# Patient Record
Sex: MALE | Race: OTHER | HISPANIC OR LATINO | URBAN - METROPOLITAN AREA
[De-identification: names, ages, dates, MRNs, and addresses within clinical notes are randomized per-mention and may not be internally consistent; named-entity substitution may affect disease eponyms.]

---

## 2021-01-03 ENCOUNTER — OUTPATIENT (OUTPATIENT)
Dept: OUTPATIENT SERVICES | Facility: HOSPITAL | Age: 36
LOS: 1 days | End: 2021-01-03
Payer: MEDICARE

## 2021-01-03 DIAGNOSIS — Z20.828 CONTACT WITH AND (SUSPECTED) EXPOSURE TO OTHER VIRAL COMMUNICABLE DISEASES: ICD-10-CM

## 2021-01-03 PROCEDURE — U0003: CPT

## 2021-01-03 PROCEDURE — U0005: CPT

## 2021-01-03 PROCEDURE — C9803: CPT

## 2021-01-04 DIAGNOSIS — Z20.828 CONTACT WITH AND (SUSPECTED) EXPOSURE TO OTHER VIRAL COMMUNICABLE DISEASES: ICD-10-CM

## 2021-01-04 LAB — SARS-COV-2 RNA SPEC QL NAA+PROBE: SIGNIFICANT CHANGE UP

## 2021-06-10 ENCOUNTER — EMERGENCY (EMERGENCY)
Facility: HOSPITAL | Age: 36
LOS: 0 days | Discharge: HOME | End: 2021-06-10
Attending: EMERGENCY MEDICINE
Payer: MEDICAID

## 2021-06-10 VITALS
DIASTOLIC BLOOD PRESSURE: 83 MMHG | RESPIRATION RATE: 17 BRPM | SYSTOLIC BLOOD PRESSURE: 117 MMHG | OXYGEN SATURATION: 98 % | HEART RATE: 83 BPM | TEMPERATURE: 99 F

## 2021-06-10 VITALS — HEIGHT: 64 IN | WEIGHT: 160.06 LBS
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
## 2021-06-10 DIAGNOSIS — S86.011A STRAIN OF RIGHT ACHILLES TENDON, INITIAL ENCOUNTER: ICD-10-CM

## 2021-06-10 DIAGNOSIS — M25.571 PAIN IN RIGHT ANKLE AND JOINTS OF RIGHT FOOT: ICD-10-CM
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
## 2021-06-10 PROCEDURE — 29515 APPLICATION SHORT LEG SPLINT: CPT | Mod: RT

## 2021-06-10 PROCEDURE — 73610 X-RAY EXAM OF ANKLE: CPT | Mod: 26,RT

## 2021-06-10 PROCEDURE — 73610 X-RAY EXAM OF ANKLE: CPT | Mod: RT

## 2021-06-10 PROCEDURE — 29515 APPLICATION SHORT LEG SPLINT: CPT

## 2021-06-10 PROCEDURE — 99283 EMERGENCY DEPT VISIT LOW MDM: CPT | Mod: 25

## 2021-06-10 NOTE — ED STATDOCS - CARE PLAN
Principal Discharge DX:	Ankle pain   Principal Discharge DX:	Ankle pain  Secondary Diagnosis:	Achilles tendon sprain, right, initial encounter

## 2021-06-10 NOTE — ED STATDOCS - OBJECTIVE STATEMENT
Pt is a 36 year old male with no PMH who comes to the Ed s/p ankle injury 1 month ago. States that since injury has been walking on foot and having pain. TTP of the gastroc muscle. NVID, Pt able to plantar and dorsiflex. States did not come sooner because he thought it would repair itself.

## 2021-06-10 NOTE — ED STATDOCS - NSFOLLOWUPINSTRUCTIONS_ED_ALL_ED_FT
Achilles Tendon Rupture    AMBULATORY CARE:    An Achilles tendon rupture happens when your Achilles tendon tears, or separates from your heel bone. The Achilles tendon connects your calf muscle to your heel bone. It allows you to point your foot down and to rise on your toes. An Achilles tendon rupture may be caused by a sports injury or a fall.     Signs and symptoms include the following:   •A sudden pop, snap, or crack at the back of your leg      •Severe pain in your leg or ankle, especially when your foot is bent down      •Swelling, stiffness, or weakness in your leg or ankle      •A bruise on the back of your ankle      •Trouble moving or putting weight on your leg      Seek care immediately if:   •Your leg feels warm, tender, and painful. It may look swollen and red.      •Your foot or toes are numb.       Contact your healthcare provider if:   •You have a fever.      •Your symptoms do not get better with treatment.      •You have questions or concerns about your condition or care.      Treatment for an Achilles tendon rupture may depend on your age and activity level. Medicine may be prescribed to decrease pain and swelling. You will need to use a support device such as crutches, or wear a cast or splint. These devices will decrease pressure on your tendon and help it heal. Once you are stronger, you may need physical therapy to increase your flexibility and strength. Some Achilles tendon ruptures may require surgery.   •NSAIDs, such as ibuprofen, help decrease swelling, pain, and fever. This medicine is available with or without a doctor's order. NSAIDs can cause stomach bleeding or kidney problems in certain people. If you take blood thinner medicine, always ask your healthcare provider if NSAIDs are safe for you. Always read the medicine label and follow directions.      •Prescription pain medicine may be given. Ask your healthcare provider how to take this medicine safely. Some prescription pain medicines contain acetaminophen. Do not take other medicines that contain acetaminophen without talking to your healthcare provider. Too much acetaminophen may cause liver damage. Prescription pain medicine may cause constipation. Ask your healthcare provider how to prevent or treat constipation.       •Take your medicine as directed. Contact your healthcare provider if you think your medicine is not helping or if you have side effects. Tell him or her if you are allergic to any medicine. Keep a list of the medicines, vitamins, and herbs you take. Include the amounts, and when and why you take them. Bring the list or the pill bottles to follow-up visits. Carry your medicine list with you in case of an emergency.      Use a support device as directed: You may need crutches or a cane to decrease stress and pressure on your tendon. Your healthcare provider will tell you how much weight you can put on your leg. Ask for more information about how to use crutches or a cane correctly. Wear your brace or splint as directed. This devices will keep your tendon straight and help it heal.     Rest as directed. Your healthcare provider will tell you when it is okay to walk and play sports. You may not be able to play sports for 6 months or longer. Ask when you can go back to work or school. Do not drive until your healthcare provider says it is okay.    Apply ice on your Achilles tendon for 15 to 20 minutes every hour or as directed. Use an ice pack, or put crushed ice in a plastic bag. Cover it with a towel. Ice helps prevent tissue damage and decreases swelling and pain.    Elevate your heel above the level of your heart as often as you can. This will help decrease swelling and pain. Prop your heel on pillows or blankets to keep it elevated comfortably.     Go to physical therapy as directed: A physical therapist teaches you exercises to help improve movement and strength, and to decrease pain. You may not start physical therapy for a few weeks or until your cast is removed.

## 2021-06-10 NOTE — ED STATDOCS - PATIENT PORTAL LINK FT
You can access the FollowMyHealth Patient Portal offered by [facility] by registering at the following website: [email or web address]. By joining [employer]’s FollowMyHealth portal, you will also be able to view your health information using other applications (apps) compatible with our system.

## 2021-06-10 NOTE — ED ADULT TRIAGE NOTE - CHIEF COMPLAINT QUOTE
patient was playing soccer one month ago, sustained injury to his right ankle.  injury continues to cause pain when he is stretching his ankle or with palpation. +limp

## 2021-06-10 NOTE — ED STATDOCS - PROGRESS NOTE DETAILS
Patient seen and evaluated, negative xray, concern for partial achilles tear.  given this is 1 month old, can follow up outpatient.  Placed in posterior splint, provided crutches, reviewed RICE, he will be referred for ortho f/u -Justin Neri PA-C I personally saw the patient with the PA, and completed the key components of the history and physical exam. I then discussed the management plan with the PA.

## 2021-06-11 ENCOUNTER — NON-APPOINTMENT (OUTPATIENT)
Age: 36
End: 2021-06-11

## 2021-06-17 PROBLEM — Z00.00 ENCOUNTER FOR PREVENTIVE HEALTH EXAMINATION: Status: ACTIVE | Noted: 2021-06-17

## 2021-07-01 ENCOUNTER — NON-APPOINTMENT (OUTPATIENT)
Age: 36
End: 2021-07-01

## 2021-07-01 ENCOUNTER — APPOINTMENT (OUTPATIENT)
Dept: ORTHOPEDIC SURGERY | Facility: CLINIC | Age: 36
End: 2021-07-01
Payer: MEDICAID

## 2021-07-01 PROCEDURE — 97760 ORTHOTIC MGMT&TRAING 1ST ENC: CPT

## 2021-07-01 PROCEDURE — 99072 ADDL SUPL MATRL&STAF TM PHE: CPT

## 2021-07-01 PROCEDURE — 99204 OFFICE O/P NEW MOD 45 MIN: CPT | Mod: 25

## 2021-07-01 RX ORDER — ASPIRIN 325 MG/1
325 TABLET ORAL DAILY
Qty: 30 | Refills: 1 | Status: ACTIVE | COMMUNITY
Start: 2021-07-01 | End: 1900-01-01

## 2021-07-01 NOTE — DISCUSSION/SUMMARY
[de-identified] : Today I had a lengthy discussion with the patient regarding their right Achilles tendon injury. I have addressed all the patient's concerns surrounding the pathology of their condition. XR films were reviewed with the patient. \par \par At this time I would like to obtain advanced imaging of the patient's right ankle. An MRI was ordered so I can find out more about the etiology of the patient's condition. The patient should follow up with the office after obtaining the MRI. \par \par I recommended that the patient utilize a CAM boot. The patient was fitted for the CAM boot in the office today. The patient was educated about the boot wear pattern and utilization, as well as the timeframe to come out of the boot. He was also given full instructions for using the boot. I advised the patient to utilize 325 mg of Aspirin as instructed for blood thinning purposes. The prescription for the Aspirin was provided for the patient in the office today. \par \par The patient understood and verbally agreed to the treatment plan. All of their questions were answered and they were satisfied with the visit. The patient should call the office if they have any questions or experience worsening symptoms.

## 2021-07-01 NOTE — PHYSICAL EXAM
[de-identified] : General: Alert and oriented x3. In no acute distress. Pleasant in nature with a normal affect. No apparent respiratory distress.\par \par Left Foot\par Skin: Clean, dry, intact\par Inspection: No obvious malalignment, no masses, no swelling, no effusion\par Pulses: 2+ DP/PT pulses\par ROM: FOOT Full  ROM of digits, ANKLE 10 degrees of dorsiflexion, 40 degrees of plantarflexion, 10 degrees of subtalar motion.\par Painful ROM: None\par Tenderness: No tenderness over the medial malleolus, No tenderness over the lateral malleolus, no CFL/ATFL/PTFL pain, no deltoid ligament pain. No heel pain. No Achilles tenderness. No 5th metatarsal pain. No pain to the LisFranc joint. No ttp over the posterior tibial tendon.\par Stability: Negative anterior/posterior drawer.\par Strength: 5/5 ADD/ABD/TA/GS/EHL/FHL/EDL\par Neuro: Sensation in tact to light touch throughout\par Additional tests: Negative Mortons test, negative tarsal tunnel tinels, negative single heel rise.\par \par Right Foot\par Skin: Clean, dry, intact\par Inspection: No obvious malalignment, no masses, no swelling, no effusion\par Pulses: 2+ DP/PT pulses\par ROM: FOOT Full  ROM of digits, ANKLE 10 degrees of dorsiflexion, 40 degrees of plantarflexion, 10 degrees of subtalar motion.\par Painful ROM: None\par Tenderness: + Defect, +Michele's. No tenderness over the medial malleolus, No tenderness over the lateral malleolus, no CFL/ATFL/PTFL pain, no deltoid ligament pain. No heel pain. No Achilles tenderness. No 5th metatarsal pain. No pain to the LisFranc joint. No ttp over the posterior tibial tendon.\par Stability: Negative anterior/posterior drawer.\par Strength: 5/5 ADD/ABD/TA/GS/EHL/FHL/EDL\par Neuro: Sensation in tact to light touch throughout\par Additional tests: Negative Mortons test, negative tarsal tunnel tinels, negative single heel rise.  [de-identified] : EXAM: XR ANKLE COMP MIN 3 VIEWS RT\par \par \par PROCEDURE DATE: 06/10/2021\par \par \par \par INTERPRETATION: DATE OF STUDY: 6/10/21\par \par COMPARISON: None.\par \par CLINICAL HISTORY: Right ankle pain.\par \par FINDINGS:\par 3 views of the right ankle are submitted. The osseous and articular structures are intact without evidence of fracture or dislocation.\par Mild underlying osteoarthritis. Ankle joint space is maintained. There is mild periarticular cortical roughening and spurring at the medial and lateral aspects of the joint. Mild, chronic, post-traumatic spur/exostosis along ventral margin of the talus on lateral view. The regional soft tissues are maintained.\par \par IMPRESSION: No fracture-subluxation demonstrated.\par \par TEAGAN KEE MD; Attending Radiologist\par This document has been electronically signed. Jun 11 2021 11:37AM

## 2021-07-01 NOTE — ADDENDUM
[FreeTextEntry1] : I, Romero Bearden, acted solely as a scribe for Dr. Yandel Albrecht on this date 07/01/2021  .\par  \par All medical record entries made by the Scribe were at my, Dr. Yandel Albrecht, direction and personally dictated by me on 07/01/2021 . I have reviewed the chart and agree that the record accurately reflects my personal performance of the history, physical exam, assessment and plan. I have also personally directed, reviewed, and agreed with the chart.

## 2021-07-01 NOTE — HISTORY OF PRESENT ILLNESS
[FreeTextEntry1] : SHERYL WRIGHT is a 36 year old male who presents for initial evaluation of right ankle s/p injury 6 weeks ago. Patient was playing soccer and felt someone kick the back of his ankle.

## 2021-07-14 ENCOUNTER — OUTPATIENT (OUTPATIENT)
Dept: OUTPATIENT SERVICES | Facility: HOSPITAL | Age: 36
LOS: 1 days | End: 2021-07-14
Payer: MEDICAID

## 2021-07-14 ENCOUNTER — APPOINTMENT (OUTPATIENT)
Dept: MRI IMAGING | Facility: CLINIC | Age: 36
End: 2021-07-14
Payer: MEDICAID

## 2021-07-14 DIAGNOSIS — S86.001A UNSPECIFIED INJURY OF RIGHT ACHILLES TENDON, INITIAL ENCOUNTER: ICD-10-CM

## 2021-07-14 PROCEDURE — 73721 MRI JNT OF LWR EXTRE W/O DYE: CPT | Mod: 26,RT

## 2021-07-14 PROCEDURE — 73721 MRI JNT OF LWR EXTRE W/O DYE: CPT

## 2021-07-19 ENCOUNTER — APPOINTMENT (OUTPATIENT)
Dept: ORTHOPEDIC SURGERY | Facility: CLINIC | Age: 36
End: 2021-07-19
Payer: MEDICAID

## 2021-07-19 PROCEDURE — 99072 ADDL SUPL MATRL&STAF TM PHE: CPT

## 2021-07-19 PROCEDURE — 99214 OFFICE O/P EST MOD 30 MIN: CPT

## 2021-07-19 NOTE — PHYSICAL EXAM
[de-identified] : General: Alert and oriented x3. In no acute distress. Pleasant in nature with a normal affect. No apparent respiratory distress.\par   Left Foot\par  Skin: Clean, dry, intact\par  Inspection: No obvious malalignment, no masses, no swelling, no effusion\par  Pulses: 2+ DP/PT pulses\par  ROM: FOOT Full  ROM of digits, ANKLE 10 degrees of dorsiflexion, 40 degrees of plantarflexion, 10 degrees of subtalar motion. \par Painful ROM: None \par Tenderness: No tenderness over the medial malleolus, No tenderness over the lateral malleolus, no CFL/ATFL/PTFL pain, no deltoid ligament pain. No heel pain. N Achilles tenderness. No 5th metatarsal pain. No pain to the LisFranc joint. No ttp over the posterior tibial tendon.\par  Stability: Negative anterior/posterior drawer.\par  Strength: 5/5 ADD/ABD/TA/GS/EHL/FHL/EDL \par Neuro: Sensation in tact to light touch throughout Additional tests: Negative Mortons test, negative tarsal tunnel tinels, negative single heel rise.  \par \par Right Foot \par Skin: Clean, dry, intact\par  Inspection: No obvious malalignment, no masses, no swelling, no effusion\par  Pulses: 2+ DP/PT pulses\par  ROM: FOOT Full  ROM of digits, ANKLE 10 degrees of dorsiflexion, 40 degrees of plantarflexion, 10 degrees of subtalar motion. Painful ROM: None\par  Tenderness: + Defect, +Michele's. No tenderness over the medial malleolus, No tenderness over the lateral malleolus, no CFL/ATFL/PTFL pain, no deltoid ligament pain. No heel pain. + Achilles tenderness. No 5th metatarsal pain. No pain to the LisFranc joint. No ttp over the posterior tibial tendon. \par Stability: Negative anterior/posterior drawer.\par  Strength: 5/5 ADD/ABD/TA/GS/EHL/FHL/EDL\par  Neuro: Sensation in tact to light touch throughout Additional tests: Negative Mortons test, negative tarsal tunnel tinels, negative single heel rise.  [de-identified] : MRI of right foot done on 07/14/2021 results reviewed 7/19/21, results as reported: Full thickness Achilles' tendon rupture.\par

## 2021-07-19 NOTE — DISCUSSION/SUMMARY
[de-identified] : Today I had a lengthy discussion with the patient regarding their right Achilles tendon injury. I have addressed all the patient's concerns surrounding the pathology of their condition. MRI results were reviewed with the patient. We discussed both operative and non-operative treatment options in detail. A discussion was had about surgery. A lengthy discussion was had about the surgery for the right Achilles' tendon injury. All risks, benefits and alternatives to the recommended surgical procedure were discussed which include but are not limited to bleeding, infection, nerve damage, vascular damage, failure of the wound to heal, the need for further surgery, loss of limb, DVT, PE, loss of life as well as the risks associated with general anesthesia. The patient verbalized understanding and provided informed consent to move forward with surgery.\par \par I recommended that the patient continue to utilize a CAM boot. I advised the patient to utilize 325 mg of Aspirin as instructed for blood thinning purposes. The prescription for the Aspirin was provided for the patient in the office today. \par \par The patient understood and verbally agreed to the treatment plan. All of their questions were answered and they were satisfied with the visit. The patient should call the office if they have any questions or experience worsening symptoms.

## 2021-07-19 NOTE — HISTORY OF PRESENT ILLNESS
[FreeTextEntry1] : 7/19/21: SHERYL DENSON is a 36 year year old male presenting for a follow-up evaluation of right Achilles rupture s/p injury 6 weeks ago. He is taking blood thinners. The patient presents wearing a CAM boot.		\par \par 7/1/21:SEHRYL RODRIGUEZNIHARIKA is a 36 year old male who presents for initial evaluation of right ankle s/p injury 6 weeks ago. Patient was playing soccer and felt someone kick the back of his ankle.

## 2021-07-19 NOTE — ADDENDUM
[FreeTextEntry1] : I, Yaquelin Ladd, acted solely as a scribe for Dr. Yandel Albrecht on this date 07/19/2021.\par \par All medical record entries made by the Scribe were at my, Dr. Yandel Albrecht, direction and personally dictated by me on 07/19/2021 . I have reviewed the chart and agree that the record accurately reflects my personal performance of the history, physical exam, assessment and plan. I have also personally directed, reviewed, and agreed with the chart.	\par

## 2021-07-31 ENCOUNTER — APPOINTMENT (OUTPATIENT)
Dept: DISASTER EMERGENCY | Facility: CLINIC | Age: 36
End: 2021-07-31

## 2021-07-31 DIAGNOSIS — Z01.818 ENCOUNTER FOR OTHER PREPROCEDURAL EXAMINATION: ICD-10-CM

## 2021-08-01 LAB — SARS-COV-2 N GENE NPH QL NAA+PROBE: NOT DETECTED

## 2021-08-02 RX ORDER — FENTANYL CITRATE 50 UG/ML
50 INJECTION INTRAVENOUS
Refills: 0 | Status: DISCONTINUED | OUTPATIENT
Start: 2021-08-03 | End: 2021-08-03

## 2021-08-02 RX ORDER — SODIUM CHLORIDE 9 MG/ML
1000 INJECTION, SOLUTION INTRAVENOUS
Refills: 0 | Status: DISCONTINUED | OUTPATIENT
Start: 2021-08-03 | End: 2021-08-03

## 2021-08-02 RX ORDER — DOCUSATE SODIUM 100 MG/1
100 CAPSULE ORAL TWICE DAILY
Qty: 30 | Refills: 0 | Status: ACTIVE | COMMUNITY
Start: 2021-08-02 | End: 1900-01-01

## 2021-08-02 RX ORDER — OXYCODONE HYDROCHLORIDE 5 MG/1
5 TABLET ORAL ONCE
Refills: 0 | Status: DISCONTINUED | OUTPATIENT
Start: 2021-08-03 | End: 2021-08-03

## 2021-08-02 RX ORDER — ASPIRIN 325 MG/1
325 TABLET, FILM COATED ORAL
Qty: 30 | Refills: 2 | Status: ACTIVE | COMMUNITY
Start: 2021-08-02 | End: 1900-01-01

## 2021-08-02 RX ORDER — OXYCODONE 5 MG/1
5 TABLET ORAL
Qty: 40 | Refills: 0 | Status: ACTIVE | COMMUNITY
Start: 2021-08-02 | End: 1900-01-01

## 2021-08-02 RX ORDER — ONDANSETRON 4 MG/1
4 TABLET ORAL
Qty: 20 | Refills: 0 | Status: ACTIVE | COMMUNITY
Start: 2021-08-02 | End: 1900-01-01

## 2021-08-02 RX ORDER — ONDANSETRON 8 MG/1
4 TABLET, FILM COATED ORAL ONCE
Refills: 0 | Status: DISCONTINUED | OUTPATIENT
Start: 2021-08-03 | End: 2021-08-03

## 2021-08-03 ENCOUNTER — APPOINTMENT (OUTPATIENT)
Dept: ORTHOPEDIC SURGERY | Facility: HOSPITAL | Age: 36
End: 2021-08-03

## 2021-08-03 ENCOUNTER — OUTPATIENT (OUTPATIENT)
Dept: INPATIENT UNIT | Facility: HOSPITAL | Age: 36
LOS: 1 days | Discharge: HOME | End: 2021-08-03
Payer: MEDICAID

## 2021-08-03 VITALS
RESPIRATION RATE: 14 BRPM | SYSTOLIC BLOOD PRESSURE: 115 MMHG | HEART RATE: 57 BPM | HEIGHT: 65 IN | OXYGEN SATURATION: 100 % | WEIGHT: 175.05 LBS | DIASTOLIC BLOOD PRESSURE: 79 MMHG | TEMPERATURE: 97 F

## 2021-08-03 VITALS
OXYGEN SATURATION: 100 % | HEART RATE: 61 BPM | SYSTOLIC BLOOD PRESSURE: 136 MMHG | RESPIRATION RATE: 14 BRPM | DIASTOLIC BLOOD PRESSURE: 84 MMHG | TEMPERATURE: 97 F

## 2021-08-03 DIAGNOSIS — S86.011A STRAIN OF RIGHT ACHILLES TENDON, INITIAL ENCOUNTER: ICD-10-CM

## 2021-08-03 PROCEDURE — 27654 REPAIR OF ACHILLES TENDON: CPT | Mod: RT

## 2021-08-03 PROCEDURE — C1889: CPT

## 2021-08-03 RX ORDER — ASPIRIN/CALCIUM CARB/MAGNESIUM 324 MG
1 TABLET ORAL
Qty: 0 | Refills: 0 | DISCHARGE

## 2021-08-03 NOTE — BRIEF OPERATIVE NOTE - NSICDXBRIEFPROCEDURE_GEN_ALL_CORE_FT
PROCEDURES:  Repair of Achilles tendon using lengthening or grafting technique 03-Aug-2021 10:52:16 Repair of the Rt achilles tendon using an allograft Kandis Baltazar

## 2021-08-03 NOTE — ASU DISCHARGE PLAN (ADULT/PEDIATRIC) - CARE PROVIDER_API CALL
Yandel Albrecht (DO)  Orthopaedic Surgery  [address] [place] [address]  Phone: [phone]  Fax: [phone]  Follow Up Time:

## 2021-08-03 NOTE — BRIEF OPERATIVE NOTE - OPERATION/FINDINGS
Complete right achilles tendon tear, For details, please check the post-operative notes. Complete right Achilles tendon tear, For details, please check the post-operative notes.

## 2021-08-03 NOTE — ASU DISCHARGE PLAN (ADULT/PEDIATRIC) - ASU DC SPECIAL INSTRUCTIONSFT
1. Pain Control with tylenol and acetaminophen over the counter, pain meds were sent for severe pain to the pharmacy.   2. No weight bearing right lower extremity in splint. Foot will swell at bit, this is normal, elevate the leg to help with swelling.   3. Keep dressing clean dry and intact and do not remove.   4. Follow up with Dr. Albrecht as outpatient in 1-2 weeks. Call office for appointment.  5. Dressing/splint to be removed at office visit, and repeat x-rays as needed.  6. Ice/Elevate affected area as needed but keep dry.

## 2021-08-03 NOTE — ASU DISCHARGE PLAN (ADULT/PEDIATRIC) - NURSING INSTRUCTIONS
Refer to the multicolored fact sheet for any problems you experience. If you have difficulty urinating or unable to urinate in 8 hours after surgery, call your Dr., or return to  emergency room.  Notify your Dr. if your fever is 101 or greater. If the pain medicine your  recaryannds does not help you, or you have severe pain call your Dr.. If you cannot reach the doctor, call [facility] Emergency Department at [phone] or go to your local Emergency Department. A responsible adult should be with you for the rest of the day and night for your safety, and to help you. Apply ice to affected area 20min on and 20min off for the  first 24-48 hours. Do not apply directly to skin, place barrier between ice and skin.  Resume your medications as listed on the attached Medication Record.

## 2021-08-03 NOTE — BRIEF OPERATIVE NOTE - NSICDXBRIEFPREOP_GEN_ALL_CORE_FT
PRE-OP DIAGNOSIS:  Complete rupture of right Achilles tendon 03-Aug-2021 10:52:49  Kandis Baltazar

## 2021-08-03 NOTE — BRIEF OPERATIVE NOTE - NSICDXBRIEFPOSTOP_GEN_ALL_CORE_FT
POST-OP DIAGNOSIS:  Complete rupture of right Achilles tendon 03-Aug-2021 10:53:05  Kandis Baltazar

## 2021-08-03 NOTE — ASU DISCHARGE PLAN (ADULT/PEDIATRIC) - CALL YOUR DOCTOR IF YOU HAVE ANY OF THE FOLLOWING:
Pain not relieved by Medications/Numbness, tingling, color or temperature change to extremity/Nausea and vomiting that does not stop
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
## 2021-08-06 DIAGNOSIS — Z79.82 LONG TERM (CURRENT) USE OF ASPIRIN: ICD-10-CM

## 2021-08-06 DIAGNOSIS — S86.011A STRAIN OF RIGHT ACHILLES TENDON, INITIAL ENCOUNTER: ICD-10-CM
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
## 2021-08-11 ENCOUNTER — APPOINTMENT (OUTPATIENT)
Dept: ORTHOPEDIC SURGERY | Facility: CLINIC | Age: 36
End: 2021-08-11
Payer: MEDICAID

## 2021-08-11 PROBLEM — S86.001S: Chronic | Status: ACTIVE | Noted: 2021-08-02

## 2021-08-11 PROCEDURE — 99024 POSTOP FOLLOW-UP VISIT: CPT

## 2021-08-11 NOTE — HISTORY OF PRESENT ILLNESS
[de-identified] : Status post right ankle Achilles tendon repair 8/3/2021 [de-identified] : The patient is a 36-year-old male who is status post right ankle Achilles tendon repair on 8/3/2021.  The patient presents nonweightbearing, using crutches with the protective splint on.  The patient denies fevers, chills, night sweats, shortness of breath.  He continues to use aspirin daily for blood clot prevention.  He is using his pain meds sparingly for pain control.  His pain scale is controlled today 2 out of 10.  He has no other complaints. [de-identified] : Physical exam of the right ankle:\par \par No erythema, warmth, rubor.  There are no signs of infection present.  He has minimal swelling of the ankle.  The incision is clean, dry, intact.  The nylon sutures are intact with no wound dehiscence.  Ankle range of motion was not tested today.  Strength was not tested today.  Negative Homans' sign without calf pain.  Dorsalis pedis pulses normal.  Capillary refill is less than 2 seconds in the toes.  Neurovascularly intact. [de-identified] : No imaging performed today. [de-identified] : Status post right ankle Achilles tendon repair 8/3/2021 [de-identified] : At this point in time I placed the patient in a long cam boot with heel lifts.  The patient is to treat the boot like a cast.  He must sleep with the boot.  The only time he is to take off the boot is for hygiene and icing and elevating the ankle.  I do not want him moving his ankle up and down at this time.  He is able to wiggle his toes.  The patient will continue with aspirin for DVT prophylaxis as directed.  He has pain meds and can take them as directed, as needed.  He will follow up in 1 week for suture removal.  All of his questions were answered and he understood the treatment course at this time.

## 2021-08-20 ENCOUNTER — APPOINTMENT (OUTPATIENT)
Dept: ORTHOPEDIC SURGERY | Facility: CLINIC | Age: 36
End: 2021-08-20
Payer: MEDICAID

## 2021-08-20 PROCEDURE — 99024 POSTOP FOLLOW-UP VISIT: CPT

## 2021-08-20 NOTE — ADDENDUM
[FreeTextEntry1] : I, Yaquelin Ladd, acted solely as a scribe for Dr. Yandel Albrecht on this date 08/20/2021.\par \par All medical record entries made by the Scribe were at my, Dr. Yandel Albrecht, direction and personally dictated by me on 08/20/2021 . I have reviewed the chart and agree that the record accurately reflects my personal performance of the history, physical exam, assessment and plan. I have also personally directed, reviewed, and agreed with the chart.	\par

## 2021-08-20 NOTE — HISTORY OF PRESENT ILLNESS
[___ Weeks Post Op] : [unfilled] weeks post op [Doing Well] : is doing well [Excellent Pain Control] : has excellent pain control [No Sign of Infection] : is showing no signs of infection [Sutures Removed] : sutures were removed [Chills] : no chills [Fever] : no fever [Nausea] : no nausea [Vomiting] : no vomiting [Healed] : not healed [de-identified] : Status post right ankle Achilles tendon repair 8/3/2021 [de-identified] : The patient is a 36-year-old male who is status post right ankle Achilles tendon repair on 8/3/2021.  The patient presents nonweightbearing, using crutches with the protective splint on.  The patient denies fevers, chills, night sweats, shortness of breath.  He continues to use aspirin daily for blood clot prevention.  He is not currently on any pain medication.  His pain scale is controlled today 2 out of 10.  He has no other complaints. [de-identified] : Physical exam of the right ankle:\par \par No erythema, warmth, rubor.  There are no signs of infection present.  He has minimal swelling of the ankle.  The incision is clean, dry, intact.  The nylon sutures are intact with no wound dehiscence.  No ankle ROM.  Strength was not tested today.  Negative Homans' sign without calf pain.  Dorsalis pedis pulses normal.  Capillary refill is less than 2 seconds in the toes.  Neurovascularly intact. [de-identified] : No new imaging performed today. [de-identified] : Status post right ankle Achilles tendon repair DOS: 8/3/2021 [de-identified] : At this point in time I would like the patient to utilize a long cam boot with heel lifts.  The patient is to treat the boot like a cast.  The only time he is to take off the boot is for hygiene and icing and elevating the ankle. I do not want him moving his ankle up and down at this time.  He is able to wiggle his toes.  The patient will continue with aspirin for DVT prophylaxis as directed.  He has pain meds and can take them as directed, as needed.  He will follow up in 2-3 weeks.  All of his questions were answered and he understood the treatment course at this time.

## 2021-09-08 ENCOUNTER — APPOINTMENT (OUTPATIENT)
Dept: ORTHOPEDIC SURGERY | Facility: CLINIC | Age: 36
End: 2021-09-08
Payer: MEDICAID

## 2021-09-08 PROCEDURE — 99024 POSTOP FOLLOW-UP VISIT: CPT

## 2021-09-08 NOTE — HISTORY OF PRESENT ILLNESS
[___ Weeks Post Op] : [unfilled] weeks post op [Doing Well] : is doing well [Excellent Pain Control] : has excellent pain control [No Sign of Infection] : is showing no signs of infection [Swelling] : swollen [Chills] : no chills [Fever] : no fever [Nausea] : no nausea [Vomiting] : no vomiting [Healed] : not healed [Discharge] : absent of discharge [Dehiscence] : not dehisced [de-identified] : Status post right ankle Achilles tendon repair 8/3/2021 [de-identified] : The patient is a 36-year-old male who is status post right ankle Achilles tendon repair on 8/3/2021.  The patient presents to the clinic today nonweightbearing, without crutches.  The patient denies fevers, chills, night sweats, shortness of breath.  He continues to use aspirin daily for DVT Prophylaxis. He is not currently on any pain medication.  His pain scale is controlled today 2/10.  He has no other complaints. [de-identified] : Physical exam of the right ankle:\par \par No erythema, warmth, rubor.  There are no signs of infection present.  He has minimal swelling of the ankle.  The incision is clean, dry, intact.  The incision site is clean, dry, and intact with no wound dehiscence.  No ankle ROM.  Strength was not tested today.  Negative Homans' sign without calf pain.  Dorsalis pedis pulses normal.  Capillary refill is less than 2 seconds in the toes.  Neurovascularly intact.\par \par Negative Michele's Squeeze test with no flicker. No calf pain.  [de-identified] : No new imaging obtained.  [de-identified] : At this point in time I would like the patient to transition out of the long cam boot with heel lifts. Further, I recommend the patient undergo a course of physical therapy for the right ankle 2-3 times a week for a total of 6-8 weeks. A prescription was given for the physical therapy today.\par 		\par At this time, I recommended that the patient utilize an ASO brace once he is 2 months post op with the guidance of physical therapy. The patient was fitted for the ASO brace in the office today.  The patient will continue with aspirin for DVT prophylaxis as directed while in the CAM boot.  He may utilize anti-inflammatories and RICE therapy as needed. He will follow up in 1-2 months for re-evaluation.  All of his questions were answered and he understood the treatment course at this time.\par \par Patient is 100% temporarily disabled and will be out of work for the next 6-8 weeks.  [de-identified] : Status post right ankle Achilles tendon repair DOS: 8/3/2021

## 2021-09-08 NOTE — ADDENDUM
[FreeTextEntry1] : I, Yaquelin Ladd, acted solely as a scribe for Dr. Yandel Albrecht on this date 09/08/2021.\par \par All medical record entries made by the Scribe were at my, Dr. Yandel Albrecht, direction and personally dictated by me on 09/08/2021 . I have reviewed the chart and agree that the record accurately reflects my personal performance of the history, physical exam, assessment and plan. I have also personally directed, reviewed, and agreed with the chart.	\par

## 2021-09-22 ENCOUNTER — APPOINTMENT (OUTPATIENT)
Dept: ORTHOPEDIC SURGERY | Facility: CLINIC | Age: 36
End: 2021-09-22
Payer: MEDICAID

## 2021-09-22 PROCEDURE — 99024 POSTOP FOLLOW-UP VISIT: CPT

## 2021-11-22 ENCOUNTER — APPOINTMENT (OUTPATIENT)
Dept: ORTHOPEDIC SURGERY | Facility: CLINIC | Age: 36
End: 2021-11-22
Payer: MEDICAID

## 2021-11-22 PROCEDURE — 99213 OFFICE O/P EST LOW 20 MIN: CPT

## 2021-11-22 NOTE — ADDENDUM
[FreeTextEntry1] : I, Yaquelin Ladd, acted solely as a scribe for Dr. Yandel Albrecht on this date 09/22/2021.\par \par All medical record entries made by the Scribe were at my, Dr. Yandel Albrecht, direction and personally dictated by me on 09/22/2021 . I have reviewed the chart and agree that the record accurately reflects my personal performance of the history, physical exam, assessment and plan. I have also personally directed, reviewed, and agreed with the chart.	\par

## 2021-11-22 NOTE — PHYSICAL EXAM
[de-identified] : General: Alert and oriented x3. In no acute distress. Pleasant in nature with a normal affect. No apparent respiratory distress.\par \par Right Ankle Exam\par Skin: Clean, dry, intact. Incisions are healed. \par Inspection: No obvious malalignment, no swelling, no effusion; no lymphadenopathy\par Pulses: 2+ DP/PT pulses\par ROM: 10 degrees of dorsiflexion, 40 degrees of plantarflexion, 10 degrees of subtalar motion\par Tenderness: No tenderness over the lateral malleolus, no CFL/ATFL/PTFL pain. No medial malleolus pain, no deltoid ligament pain. No proximal fibular pain. No heel pain.\par Stability: Negative anterior/posterior drawer.\par Strength: 5/5 TA/GS/EHL\par Neuro: In tact to light touch throughout\par Additional tests: Negative Mortons test, Negative syndesmosis squeeze test. Negative Michele's Test. 					\par  [de-identified] : No new imaging.

## 2021-11-22 NOTE — HISTORY OF PRESENT ILLNESS
[___ Weeks Post Op] : [unfilled] weeks post op [Swelling] : swollen [Doing Well] : is doing well [Excellent Pain Control] : has excellent pain control [No Sign of Infection] : is showing no signs of infection [Chills] : no chills [Fever] : no fever [Nausea] : no nausea [Vomiting] : no vomiting [Healed] : not healed [Erythema] : not erythematous [Discharge] : absent of discharge [Dehiscence] : not dehisced [de-identified] : Status post right ankle Achilles tendon repair DOS: 8/3/2021 [de-identified] : The patient is a 36-year-old male who is status post right ankle Achilles tendon repair on 8/3/2021. Overall, the patient states that he has been improving. He denies fevers, chills, night sweats, shortness of breath.  He continues to utilize aspirin daily for DVT Prophylaxis. He is not currently on any pain medication.  His pain scale is 3/10.  He has no other complaints. The patient presents wearing a CAM boot and is ambulating on crutches. [de-identified] : Physical exam of the right ankle:\par \par No erythema, warmth, rubor.  There are no signs of infection present.  He has minimal swelling of the ankle.  The incision is clean, dry, intact.  The incision site is clean, dry, and intact with no wound dehiscence.  Ankle ROM was not tested today.  Strength was not tested today.  Negative Homans' sign without calf pain.  Dorsalis pedis pulses normal.  Capillary refill is less than 2 seconds in the toes.  Neurovascularly intact.\par \par Michele's Squeeze Test is intact.  [de-identified] : No new imaging obtained today. [de-identified] : The patient is a 36-year-old male who is status post right ankle Achilles tendon repair on 8/3/2021.  [de-identified] : At this point in time I would like the patient to transition out of the long cam boot with heel lifts. Once the patient transitions into the ASO brace, he may discontinue the utilization of Aspirin for DVT Prophylaxis. \par \par Further, I recommend the patient continue to undergo a course of physical therapy for the right ankle 2-3 times a week for a total of 6-8 weeks. A renewal prescription was given for the physical therapy today. He may WBAT while in the boot. \par 		\par At this time, I recommended that the patient transition to an ASO brace in the next 2-3 weeks with the guidance of physical therapy. The patient was previously fitted for the ASO brace in the office today. \par \par He may utilize anti-inflammatories and RICE therapy as needed. \par \par He will follow up in 3 months for re-evaluation.  All of his questions were answered and he understood the treatment course at this time.\par \par Patient is 100% temporarily disabled from return to work at this time.

## 2021-11-22 NOTE — REASON FOR VISIT
[Follow-Up Visit] : a follow-up visit for [FreeTextEntry2] : right Achilles tendon repair on 8/3/2021

## 2021-11-22 NOTE — DISCUSSION/SUMMARY
[de-identified] : Today I had a lengthy discussion with the patient for their s/p post right ankle Achilles tendon repair on 8/3/2021 I have addressed all the patient's concerns surrounding the pathology of their condition. I recommend the patient continue to undergo a course of physical therapy for the right Achilles  2-3 times a week for a total of 6-8 weeks. A renewal prescription was given for the physical therapy today. At this time, the patient has no restrictions. He may continue to utilize the ASO brace as tolerated for support. The patient understood and verbally agreed to the treatment plan. All of their questions were answered and they were satisfied with the visit. The patient should call the office if they have any questions or experience worsening symptoms. I would like to see the patient back in the office in 3 months to reassess their condition. 				\par

## 2021-11-22 NOTE — HISTORY OF PRESENT ILLNESS
[FreeTextEntry1] : The patient is a 36-year-old male who is status post right ankle Achilles tendon repair on 8/3/2021. Overall, the patient states that he has been improving. He denies fevers, chills, night sweats, shortness of breath. He has no pain in office today.  He does note intermittent pain after work and after walking long distances. The patient has been attending physical therapy to work on strengthening of ankle. He presents to the office wearing sneakers with an ASO brace. No other complaints.

## 2021-11-22 NOTE — ADDENDUM
[FreeTextEntry1] : I, Yaquelin Wilberto, acted solely as a scribe for Cb Beck PA-C on this date 11/22/2021.\par \par All medical record entries made by the Scribe were at my, Cb Beck PA-C, direction and personally dictated by me on 11/22/2021 . I have reviewed the chart and agree that the record accurately reflects my personal performance of the history, physical exam, assessment and plan. I have also personally directed, reviewed, and agreed with the chart.	\par

## 2022-02-28 ENCOUNTER — APPOINTMENT (OUTPATIENT)
Dept: ORTHOPEDIC SURGERY | Facility: CLINIC | Age: 37
End: 2022-02-28

## 2022-03-10 ENCOUNTER — APPOINTMENT (OUTPATIENT)
Dept: ORTHOPEDIC SURGERY | Facility: CLINIC | Age: 37
End: 2022-03-10
Payer: MEDICAID

## 2022-03-10 DIAGNOSIS — S86.011A STRAIN OF RIGHT ACHILLES TENDON, INITIAL ENCOUNTER: ICD-10-CM

## 2022-03-10 PROCEDURE — 99212 OFFICE O/P EST SF 10 MIN: CPT

## 2022-03-10 NOTE — HISTORY OF PRESENT ILLNESS
[FreeTextEntry1] : Procedure: Right Achilles tendon repair\par DOS: 8/3/2021\par \par 37 year old male who presents today for follow up evaluation of right ankle pain. He is 7 months s/p Achilles tendon repair. Patient reports to the office in normal footwear without a brace. He states he is feeling improved from prior visit. Pain in the ankle and plantar fascia have resolved. He has discontinued PT and is doing a HEP. Patient is back to doing most activities. There are no other orthopedic concerns at this time.

## 2022-03-10 NOTE — PHYSICAL EXAM
[de-identified] : Right ankle Physical Examination:\par \par General: Alert and oriented x3.  In no acute distress.  Pleasant in nature with a normal affect.  No apparent respiratory distress. \par Erythema, Warmth, Rubor: Negative\par Swelling: Negative\par \par ROM:\par 1. Dorsiflexion: 10 degrees\par 2. Plantarflexion: 40 degrees\par 3. Inversion: 10 degrees\par 4. Eversion: 10 degrees\par \par Tenderness to Palpation: \par 1. Lateral Malleolus: Negative\par 2. Medial Malleolus: Negative\par 3. Proximal Fibular Pain: Negative\par 4. Heel Pain: Negative\par 5. Cuboid: Negative\par 6. Navicular: Negative\par 7. Tibiotalar Joint: Negative\par 8. Subtalar Joint: Negative\par 9. Posterior Recess: Negative\par \par Tendon Pain:\par 1. Achilles: Negative, negative Michele squeeze sign.\par 2. Peroneals: Negative\par 3. Posterior Tibialis: Negative\par 4. Tibialis Anterior: Negative\par \par Ligament Pain:\par 1. ATFL: Negative\par 2. CFL: Negative \par 3. PTFL: Negative\par 4. Deltoid Ligaments: Negative\par 5. Lis Franc Ligament: Negative\par \par Stability: \par 1. Anterior Drawer: Negative\par 2. Posterior Drawer: Negative\par \par Strength: 5/5 TA/GS/EHL\par \par Pulses: 2+ DP/PT Pulses\par \par Neuro: Intact motor and sensory\par \par Additional Test:\par 1. Calcaneal Squeeze Test: Negative\par 2. Syndesmosis Squeeze Test: Negative [de-identified] : No imaging performed.

## 2022-03-10 NOTE — DISCUSSION/SUMMARY
[de-identified] : At this point time the patient has normal strength in the ankle and has no pain.  He has no restrictions in regards to getting back to sporting activities.  I do want him listening to the ankle and not do too much too soon.  He can use over-the-counter anti-inflammatories.  He does not need a brace at this time.  All questions answered.  He can follow-up on an as-needed basis.

## 2022-07-20 ENCOUNTER — NON-APPOINTMENT (OUTPATIENT)
Age: 37
End: 2022-07-20

## 2023-10-17 ENCOUNTER — EMERGENCY (EMERGENCY)
Facility: HOSPITAL | Age: 38
LOS: 0 days | Discharge: HOME | End: 2023-10-17
Attending: STUDENT IN AN ORGANIZED HEALTH CARE EDUCATION/TRAINING PROGRAM
Payer: COMMERCIAL

## 2023-10-17 VITALS
OXYGEN SATURATION: 100 % | DIASTOLIC BLOOD PRESSURE: 74 MMHG | SYSTOLIC BLOOD PRESSURE: 121 MMHG | HEART RATE: 61 BPM | RESPIRATION RATE: 18 BRPM

## 2023-10-17 VITALS
HEART RATE: 72 BPM | OXYGEN SATURATION: 95 % | WEIGHT: 184.97 LBS | TEMPERATURE: 97 F | SYSTOLIC BLOOD PRESSURE: 133 MMHG | HEIGHT: 69 IN | RESPIRATION RATE: 18 BRPM | DIASTOLIC BLOOD PRESSURE: 90 MMHG

## 2023-10-17 DIAGNOSIS — S33.5XXA SPRAIN OF LIGAMENTS OF LUMBAR SPINE, INITIAL ENCOUNTER: ICD-10-CM
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
## 2023-10-17 DIAGNOSIS — M54.50 LOW BACK PAIN, UNSPECIFIED: ICD-10-CM
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
## 2023-10-17 PROCEDURE — 99283 EMERGENCY DEPT VISIT LOW MDM: CPT

## 2023-10-17 PROCEDURE — 99284 EMERGENCY DEPT VISIT MOD MDM: CPT

## 2023-10-17 RX ORDER — LIDOCAINE 4 G/100G
1 CREAM TOPICAL ONCE
Refills: 0 | Status: COMPLETED | OUTPATIENT
Start: 2023-10-17 | End: 2023-10-17

## 2023-10-17 RX ORDER — CYCLOBENZAPRINE HYDROCHLORIDE 10 MG/1
1 TABLET, FILM COATED ORAL
Qty: 20 | Refills: 0
Start: 2023-10-17

## 2023-10-17 RX ORDER — IBUPROFEN 200 MG
600 TABLET ORAL ONCE
Refills: 0 | Status: COMPLETED | OUTPATIENT
Start: 2023-10-17 | End: 2023-10-17

## 2023-10-17 RX ORDER — LIDOCAINE 4 G/100G
1 CREAM TOPICAL
Qty: 30 | Refills: 0
Start: 2023-10-17

## 2023-10-17 RX ADMIN — Medication 600 MILLIGRAM(S): at 19:17

## 2023-10-17 RX ADMIN — LIDOCAINE 1 PATCH: 4 CREAM TOPICAL at 19:17
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
## 2023-10-17 NOTE — ED STATDOCS - PATIENT PORTAL LINK FT
You can access the FollowMyHealth Patient Portal offered by [facility] by registering at the following website: [email or web address]. By joining [facility]’s FollowMyHealth portal, you will also be able to view your health information using other applications (apps) compatible with our system.
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
## 2023-10-17 NOTE — ED ADULT NURSE NOTE - CAS TRG GEN SKIN CONDITION
Quality 154 Part B: Falls: Risk Screening (Should Be Reported With Measure 155.): Patient screened for future fall risk; documentation of no falls in the past year or only one fall without injury in the past year Quality 131: Pain Assessment And Follow-Up: Pain assessment using a standardized tool is documented as negative, no follow-up plan required Quality 134: Screening For Clinical Depression And Follow-Up Plan: The patient was screened for depression and the screen was negative and no follow up required Quality 111:Pneumonia Vaccination Status For Older Adults: Pneumococcal Vaccination Previously Received Quality 226: Preventive Care And Screening: Tobacco Use: Screening And Cessation Intervention: Patient screened for tobacco and is an ex-smoker Detail Level: Detailed Quality 110: Preventive Care And Screening: Influenza Immunization: Influenza Immunization Administered during Influenza season Quality 47: Advance Care Plan: Advance Care Planning discussed and documented; advance care plan or surrogate decision maker documented in the medical record. Quality 128: Preventive Care And Screening: Body Mass Index (Bmi) Screening And Follow-Up Plan: BMI is documented within normal parameters and no follow-up plan is required. Quality 154 Part A: Falls: Risk Assessment (Should Be Reported With Measure 155.): Falls risk assessment completed and documented in the past 12 months. Quality 130: Documentation Of Current Medications In The Medical Record: Current Medications Documented Quality 400a: One-Time Screening For Hepatitis C Virus (Hcv) For All Patients: One-time screening for HCV infection not received within 12 month period and no documentation of prior screening, reasont not given. Warm/Dry

## 2023-10-17 NOTE — ED ADULT NURSE NOTE - NSFALLUNIVINTERV_ED_ALL_ED
Bed/Stretcher in lowest position, wheels locked, appropriate side rails in place/Call bell, personal items and telephone in reach/Instruct patient to call for assistance before getting out of bed/chair/stretcher/Non-slip footwear applied when patient is off stretcher/[place] to call system/Physically safe environment - no spills, clutter or unnecessary equipment/Purposeful proactive rounding/Room/bathroom lighting operational, light cord in reach

## 2023-10-17 NOTE — ED STATDOCS - ATTENDING APP SHARED VISIT CONTRIBUTION OF CARE
I, Arcadio Renteria DO, personally saw the patient with ACP.  I have personally performed a face to face diagnostic evaluation on this patient.  I have reviewed the ACP note and agree with the history, exam, and plan of care, except as noted.   The initial assessment was performed by myself and then the patient was handed off to the ACP. The patient was followed and re-evaluated by the ACP. All labs, imaging and procedures were evaluated and performed by the ACP and I was available for consultation if any questions in the patients care came up.

## 2023-10-17 NOTE — ED STATDOCS - RESPIRATORY, MLM
breath sounds clear and equal bilaterally. No w/r/r No Residual Tumor Seen Histology Text: No residual malignant cells noted
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
## 2023-10-17 NOTE — ED STATDOCS - NS ED ATTENDING STATEMENT MOD
This was a shared visit with the ROSALINA. I reviewed and verified the documentation and independently performed the documented:

## 2023-10-17 NOTE — ED STATDOCS - CARE PROVIDER_API CALL
Adam Restrepo  Neurosurgery  [address] [place] [id number]  Phone: [phone]  Fax: [phone]  Follow Up Time: Routine

## 2023-10-17 NOTE — ED STATDOCS - MUSCULOSKELETAL, MLM
range of motion is not limited and there is no muscle tenderness. BACK: +Mild MSK tenderness left lower back paravertebral lumbar area. L-spine non-tender. FROM. No spinal deformity.

## 2023-10-17 NOTE — ED STATDOCS - PHYSICAL EXAMINATION
GENERAL: A&Ox4, non-toxic appearing, no acute distress  HEENT: NCAT, EOMI, oral mucosa moist, normal conjunctiva  RESP: no respiratory distress, speaking in full sentences  CV: RRR  MSK: no midline spinal tenderness. +L paralumbar tenderness   NEURO: no focal sensory or motor deficits, CN 2-12 grossly intact  SKIN: warm, normal color, well perfused, no rash  PSYCH: normal affect Attending: GENERAL: A&Ox4, non-toxic appearing, no acute distress  HEENT: NCAT, EOMI, oral mucosa moist, normal conjunctiva  RESP: no respiratory distress, speaking in full sentences  CV: RRR  MSK: no midline spinal tenderness. +L paralumbar tenderness   NEURO: no focal sensory or motor deficits, CN 2-12 grossly intact  SKIN: warm, normal color, well perfused, no rash  PSYCH: normal affect

## 2025-01-25 ENCOUNTER — NON-APPOINTMENT (OUTPATIENT)
Age: 40
End: 2025-01-25